# Patient Record
Sex: MALE | Race: WHITE | NOT HISPANIC OR LATINO | Employment: OTHER | URBAN - METROPOLITAN AREA
[De-identification: names, ages, dates, MRNs, and addresses within clinical notes are randomized per-mention and may not be internally consistent; named-entity substitution may affect disease eponyms.]

---

## 2021-09-24 ENCOUNTER — LAB REQUISITION (OUTPATIENT)
Dept: LAB | Facility: HOSPITAL | Age: 56
End: 2021-09-24
Payer: COMMERCIAL

## 2021-09-24 DIAGNOSIS — G89.29 OTHER CHRONIC PAIN: ICD-10-CM

## 2021-09-24 DIAGNOSIS — M05.79 RHEUMATOID ARTHRITIS WITH RHEUMATOID FACTOR OF MULTIPLE SITES WITHOUT ORGAN OR SYSTEMS INVOLVEMENT (HCC): ICD-10-CM

## 2021-09-24 LAB
ALBUMIN SERPL BCP-MCNC: 4 G/DL (ref 3.5–5)
ALP SERPL-CCNC: 86 U/L (ref 46–116)
ALT SERPL W P-5'-P-CCNC: 55 U/L (ref 12–78)
ANION GAP SERPL CALCULATED.3IONS-SCNC: 5 MMOL/L (ref 4–13)
AST SERPL W P-5'-P-CCNC: 29 U/L (ref 5–45)
BASOPHILS # BLD AUTO: 0.06 THOUSANDS/ΜL (ref 0–0.1)
BASOPHILS NFR BLD AUTO: 1 % (ref 0–1)
BILIRUB SERPL-MCNC: 0.72 MG/DL (ref 0.2–1)
BUN SERPL-MCNC: 14 MG/DL (ref 5–25)
CALCIUM SERPL-MCNC: 9.2 MG/DL (ref 8.3–10.1)
CHLORIDE SERPL-SCNC: 103 MMOL/L (ref 100–108)
CO2 SERPL-SCNC: 30 MMOL/L (ref 21–32)
CREAT SERPL-MCNC: 0.84 MG/DL (ref 0.6–1.3)
CRP SERPL QL: 65.6 MG/L
EOSINOPHIL # BLD AUTO: 0.23 THOUSAND/ΜL (ref 0–0.61)
EOSINOPHIL NFR BLD AUTO: 3 % (ref 0–6)
ERYTHROCYTE [DISTWIDTH] IN BLOOD BY AUTOMATED COUNT: 12.6 % (ref 11.6–15.1)
ERYTHROCYTE [SEDIMENTATION RATE] IN BLOOD: 26 MM/HOUR (ref 0–19)
GFR SERPL CREATININE-BSD FRML MDRD: 99 ML/MIN/1.73SQ M
GLUCOSE SERPL-MCNC: 71 MG/DL (ref 65–140)
HCT VFR BLD AUTO: 40.9 % (ref 36.5–49.3)
HGB BLD-MCNC: 13.6 G/DL (ref 12–17)
IMM GRANULOCYTES # BLD AUTO: 0.02 THOUSAND/UL (ref 0–0.2)
IMM GRANULOCYTES NFR BLD AUTO: 0 % (ref 0–2)
LYMPHOCYTES # BLD AUTO: 2.05 THOUSANDS/ΜL (ref 0.6–4.47)
LYMPHOCYTES NFR BLD AUTO: 29 % (ref 14–44)
MCH RBC QN AUTO: 31.8 PG (ref 26.8–34.3)
MCHC RBC AUTO-ENTMCNC: 33.3 G/DL (ref 31.4–37.4)
MCV RBC AUTO: 96 FL (ref 82–98)
MONOCYTES # BLD AUTO: 0.62 THOUSAND/ΜL (ref 0.17–1.22)
MONOCYTES NFR BLD AUTO: 9 % (ref 4–12)
NEUTROPHILS # BLD AUTO: 4.07 THOUSANDS/ΜL (ref 1.85–7.62)
NEUTS SEG NFR BLD AUTO: 58 % (ref 43–75)
NRBC BLD AUTO-RTO: 0 /100 WBCS
PLATELET # BLD AUTO: 266 THOUSANDS/UL (ref 149–390)
PMV BLD AUTO: 10.4 FL (ref 8.9–12.7)
POTASSIUM SERPL-SCNC: 4.9 MMOL/L (ref 3.5–5.3)
PROT SERPL-MCNC: 7.6 G/DL (ref 6.4–8.2)
RBC # BLD AUTO: 4.28 MILLION/UL (ref 3.88–5.62)
SODIUM SERPL-SCNC: 138 MMOL/L (ref 136–145)
WBC # BLD AUTO: 7.05 THOUSAND/UL (ref 4.31–10.16)

## 2021-09-24 PROCEDURE — 86704 HEP B CORE ANTIBODY TOTAL: CPT | Performed by: SPECIALIST

## 2021-09-24 PROCEDURE — 85652 RBC SED RATE AUTOMATED: CPT | Performed by: SPECIALIST

## 2021-09-24 PROCEDURE — 86140 C-REACTIVE PROTEIN: CPT | Performed by: SPECIALIST

## 2021-09-24 PROCEDURE — 86803 HEPATITIS C AB TEST: CPT | Performed by: SPECIALIST

## 2021-09-24 PROCEDURE — 85025 COMPLETE CBC W/AUTO DIFF WBC: CPT | Performed by: SPECIALIST

## 2021-09-24 PROCEDURE — 86706 HEP B SURFACE ANTIBODY: CPT | Performed by: SPECIALIST

## 2021-09-24 PROCEDURE — 80053 COMPREHEN METABOLIC PANEL: CPT | Performed by: SPECIALIST

## 2021-09-24 PROCEDURE — 86705 HEP B CORE ANTIBODY IGM: CPT | Performed by: SPECIALIST

## 2021-09-25 LAB
HBV CORE AB SER QL: NORMAL
HBV CORE IGM SER QL: NORMAL
HBV SURFACE AB SER-ACNC: <3.1 MIU/ML
HCV AB SER QL: NORMAL

## 2023-02-02 NOTE — PROGRESS NOTES
PT Evaluation     Today's date: 2/3/2023  Patient name: Penny Ojeda  : 1965  MRN: [id number]  Referring provider: Jenetta Nyhan, DO  Dx:   Encounter Diagnosis     ICD-10-CM    1  Cervical radiculopathy  M54 12                      Assessment  Assessment details: Presents with signs and symptoms of cervical nerve root pathology as well as brachial plexus/ radial nerve irritation/ entrapment in the periphery  Impairments: abnormal or restricted ROM, activity intolerance, impaired physical strength, lacks appropriate home exercise program, pain with function and poor posture     Symptom irritability: moderateUnderstanding of Dx/Px/POC: good   Prognosis: good    Goals  ST  Decreased pain 50% 6 wk   2  Decrease headaches 50% 6 wk  3  Increase c-spine ROM to   limitations 6wk  4  Increased c-spine strength to /5 6 wk   5  Increase bilateral UE strength to /5 6wk  6  Pt will report no difficulty with light ADL's 6 wk    LT  Pt will report no pain 12 wk  2  Pt will report no headaches 12 wk  3  Increase c-spine AROM to WNL 12 wk  4  Increase c-spine strength to WNL 12 wk  5  Pt will report no limitations with ADL's 12 wk   4)Centralize radicular pain in 8 weeks  Plan  Patient would benefit from: PT eval and skilled physical therapy  Planned modality interventions: cryotherapy, thermotherapy: hydrocollator packs and traction  Planned therapy interventions: joint mobilization, manual therapy, neuromuscular re-education, strengthening, stretching, therapeutic activities, therapeutic exercise, flexibility, functional ROM exercises, home exercise program and postural training  Frequency: 3x week  Duration in weeks: 10  Treatment plan discussed with: patient        Subjective Evaluation    History of Present Illness  Date of onset: 8/3/2022  Mechanism of injury: Insidious and progressive onset of right arm numbness and some neck pain  Arm symptoms are in a (radial nerve pattern)    Arm above head makes symptoms worse  Improvement in posture helps improve symptoms  Recurrent probem    Quality of life: fair    Pain  Current pain ratin  At best pain ratin  At worst pain ratin  Location: Radial nerve pattern in right arm down to hand  Quality: burning and radiating  Relieving factors: change in position  Aggravating factors: overhead activity and lifting  Progression: no change      Diagnostic Tests  MRI studies: abnormal  EMG: normal    FCE comments: Pt is still performing full duty but is having symptoms  Treatments  No previous or current treatments  Patient Goals  Patient goal: Pt wants to get rid of numbness  Objective     Postural Observations  Seated posture: fair  Standing posture: fair  Correction of posture: makes symptoms better    Additional Postural Observation Details  Significant rounded shoulders bilaterally with moderate forward head    Palpation     Additional Palpation Details  Reproduction of radicular nerve pain with palpation of right brachial plexus nerve bed both supra and infraclavicular as well as radial nerve bed from posterior deltoid down to mid extensor muscle split in forearm  Neurological Testing     Additional Neurological Details  Unremarkable dermatome and myotome  Active Range of Motion   Cervical/Thoracic Spine       Cervical    Subcranial retraction:   Restriction level: minimal  Flexion:  Restriction level: minimal  Extension:  Restriction level: moderate  Left lateral flexion:  Restriction level: moderate  Right lateral flexion:  Restriction level moderate  Left rotation:  Restriction level: minimal  Right rotation:  Restriction level: minimal    Strength/Myotome Testing     Additional Strength Details  Unremarkable    Tests   Cervical   Positive cervical distraction test and neck flexor muscle endurance test     Right   Positive Spurling's Test A and Spurling's Test B       Additional Tests Details  + ULTT at 2/5 on right compared w/ 4/5 on left  Precautions:  Rheumatoid Arthritis-significant       Manuals 2-3-23                                       Neuro Re-Ed                                                                 Ther Ex                                                        Pt Ed/ HEP Pathology and involved anatomy as well as issuing and reviewing of HEP   15'               Ther Activity                        Gait Training                        Modalities        c-tx 10#  5' x3

## 2023-02-03 ENCOUNTER — EVALUATION (OUTPATIENT)
Dept: PHYSICAL THERAPY | Facility: CLINIC | Age: 58
End: 2023-02-03

## 2023-02-03 DIAGNOSIS — M54.12 CERVICAL RADICULOPATHY: Primary | ICD-10-CM

## 2023-02-03 NOTE — LETTER
February 3, 2023    Cheryl Cifuentes MD  [phone]    Patient: Mariano Diaz   YOB: 1965   Date of Visit: 2/3/2023     Encounter Diagnosis     ICD-10-CM    1  Cervical radiculopathy  M54 12           Dear Dr Link Home: Thank you for your recent referral of Mariano Diaz  Please review the attached evaluation summary from Juancarlos's recent visit  Please verify that you agree with the plan of care by signing the attached order  If you have any questions or concerns, please do not hesitate to call  I sincerely appreciate the opportunity to share in the care of one of your patients and hope to have another opportunity to work with you in the near future  Sincerely,    Micah De La Vega, PT      Referring Provider:      I certify that I have read the below Plan of Care and certify the need for these services furnished under this plan of treatment while under my care  Cheryl Cifuentes MD  [phone]  Via Fax: [phone]          PT Evaluation     Today's date: 2/3/2023  Patient name: Mariano Diaz  : 1965  MRN: [id number]  Referring provider: Blake Mcaias DO  Dx:   Encounter Diagnosis     ICD-10-CM    1  Cervical radiculopathy  M54 12                      Assessment  Assessment details: Presents with signs and symptoms of cervical nerve root pathology as well as brachial plexus/ radial nerve irritation/ entrapment in the periphery  Impairments: abnormal or restricted ROM, activity intolerance, impaired physical strength, lacks appropriate home exercise program, pain with function and poor posture     Symptom irritability: moderateUnderstanding of Dx/Px/POC: good   Prognosis: good    Goals  ST  Decreased pain 50% 6 wk   2  Decrease headaches 50% 6 wk  3  Increase c-spine ROM to   limitations 6wk  4  Increased c-spine strength to /5 6 wk   5    Increase bilateral UE strength to /5 6wk  6  Pt will report no difficulty with light ADL's 6 wk    LT  Pt will report no pain 12 wk  2  Pt will report no headaches 12 wk  3  Increase c-spine AROM to WNL 12 wk  4  Increase c-spine strength to WNL 12 wk  5  Pt will report no limitations with ADL's 12 wk   4)Centralize radicular pain in 8 weeks  Plan  Patient would benefit from: PT eval and skilled physical therapy  Planned modality interventions: cryotherapy, thermotherapy: hydrocollator packs and traction  Planned therapy interventions: joint mobilization, manual therapy, neuromuscular re-education, strengthening, stretching, therapeutic activities, therapeutic exercise, flexibility, functional ROM exercises, home exercise program and postural training  Frequency: 3x week  Duration in weeks: 10  Treatment plan discussed with: patient        Subjective Evaluation    History of Present Illness  Date of onset: 8/3/2022  Mechanism of injury: Insidious and progressive onset of right arm numbness and some neck pain  Arm symptoms are in a (radial nerve pattern)  Arm above head makes symptoms worse  Improvement in posture helps improve symptoms  Recurrent probem    Quality of life: fair    Pain  Current pain ratin  At best pain ratin  At worst pain ratin  Location: Radial nerve pattern in right arm down to hand  Quality: burning and radiating  Relieving factors: change in position  Aggravating factors: overhead activity and lifting  Progression: no change      Diagnostic Tests  MRI studies: abnormal  EMG: normal    FCE comments: Pt is still performing full duty but is having symptoms  Treatments  No previous or current treatments  Patient Goals  Patient goal: Pt wants to get rid of numbness          Objective     Postural Observations  Seated posture: fair  Standing posture: fair  Correction of posture: makes symptoms better    Additional Postural Observation Details  Significant rounded shoulders bilaterally with moderate forward head    Palpation     Additional Palpation Details  Reproduction of radicular nerve pain with palpation of right brachial plexus nerve bed both supra and infraclavicular as well as radial nerve bed from posterior deltoid down to mid extensor muscle split in forearm  Neurological Testing     Additional Neurological Details  Unremarkable dermatome and myotome  Active Range of Motion   Cervical/Thoracic Spine       Cervical    Subcranial retraction:   Restriction level: minimal  Flexion:  Restriction level: minimal  Extension:  Restriction level: moderate  Left lateral flexion:  Restriction level: moderate  Right lateral flexion:  Restriction level moderate  Left rotation:  Restriction level: minimal  Right rotation:  Restriction level: minimal    Strength/Myotome Testing     Additional Strength Details  Unremarkable    Tests   Cervical   Positive cervical distraction test and neck flexor muscle endurance test     Right   Positive Spurling's Test A and Spurling's Test B  Additional Tests Details  + ULTT at 2/5 on right compared w/ 4/5 on left  Precautions:  Rheumatoid Arthritis-significant       Manuals 2-3-23                                       Neuro Re-Ed                                                                 Ther Ex                                                        Pt Ed/ HEP Pathology and involved anatomy as well as issuing and reviewing of HEP   15'               Ther Activity                        Gait Training                        Modalities        c-tx 10#  5' x3

## 2023-02-06 ENCOUNTER — OFFICE VISIT (OUTPATIENT)
Dept: PHYSICAL THERAPY | Facility: CLINIC | Age: 58
End: 2023-02-06

## 2023-02-06 DIAGNOSIS — M54.12 CERVICAL RADICULOPATHY: Primary | ICD-10-CM

## 2023-02-06 NOTE — PROGRESS NOTES
Daily Note     Today's date: 2023  Patient name: Bj Chaney  : 1965  MRN: [id number]  Referring provider: Lucrecia Garner  Dx:   Encounter Diagnosis     ICD-10-CM    1  Cervical radiculopathy  M54 12                      Subjective: Reduced radicular symptoms after first treatment  Objective: See treatment diary below      Assessment: Tolerated treatment well  Patient would benefit from continued PT      Plan: Progress treatment as tolerated  Precautions:  Rheumatoid Arthritis-significant       Manuals 2-3-23 2-6-23      STM c spine  10'      graston R plexus and radial nerve bed  5'      Cupping of R plexus and radial nerve bed  6'      K-Tape R plexus and radial nerve bed  5'                              Neuro Re-Ed                                                                 Ther Ex        Radial nerve flossing  5'      cspine retraction, extension, rotation  5'      Supine cervical nods  10" x10      Scalene seated stretch  10" x12                      Pt Ed/ HEP Pathology and involved anatomy as well as issuing and reviewing of HEP   15'               Ther Activity                        Gait Training                        Modalities        c-tx 10#  5' x3 10# 5' x3

## 2023-02-08 ENCOUNTER — APPOINTMENT (OUTPATIENT)
Dept: PHYSICAL THERAPY | Facility: CLINIC | Age: 58
End: 2023-02-08

## 2023-02-10 ENCOUNTER — APPOINTMENT (OUTPATIENT)
Dept: PHYSICAL THERAPY | Facility: CLINIC | Age: 58
End: 2023-02-10

## 2023-02-13 ENCOUNTER — APPOINTMENT (OUTPATIENT)
Dept: PHYSICAL THERAPY | Facility: CLINIC | Age: 58
End: 2023-02-13

## 2023-02-15 ENCOUNTER — APPOINTMENT (OUTPATIENT)
Dept: PHYSICAL THERAPY | Facility: CLINIC | Age: 58
End: 2023-02-15